# Patient Record
Sex: FEMALE | Race: WHITE
[De-identification: names, ages, dates, MRNs, and addresses within clinical notes are randomized per-mention and may not be internally consistent; named-entity substitution may affect disease eponyms.]

---

## 2020-10-11 ENCOUNTER — HOSPITAL ENCOUNTER (EMERGENCY)
Dept: HOSPITAL 9 - MADERS | Age: 45
Discharge: HOME | End: 2020-10-11
Payer: SELF-PAY

## 2020-10-11 DIAGNOSIS — I10: ICD-10-CM

## 2020-10-11 DIAGNOSIS — N39.0: Primary | ICD-10-CM

## 2020-10-11 DIAGNOSIS — F17.210: ICD-10-CM

## 2020-10-11 LAB
BACTERIA UR QL AUTO: (no result) HPF
RBC UR QL AUTO: (no result) HPF (ref 0–3)
SP GR UR STRIP: 1.02 (ref 1–1.03)
WBC UR QL AUTO: (no result) HPF (ref 0–3)

## 2020-10-11 PROCEDURE — 87077 CULTURE AEROBIC IDENTIFY: CPT

## 2020-10-11 PROCEDURE — 96372 THER/PROPH/DIAG INJ SC/IM: CPT

## 2020-10-11 PROCEDURE — 99283 EMERGENCY DEPT VISIT LOW MDM: CPT

## 2020-10-11 PROCEDURE — 87186 SC STD MICRODIL/AGAR DIL: CPT

## 2020-10-11 PROCEDURE — 81015 MICROSCOPIC EXAM OF URINE: CPT

## 2020-10-11 PROCEDURE — 87086 URINE CULTURE/COLONY COUNT: CPT

## 2020-10-11 PROCEDURE — 81003 URINALYSIS AUTO W/O SCOPE: CPT

## 2021-01-31 ENCOUNTER — HOSPITAL ENCOUNTER (EMERGENCY)
Dept: HOSPITAL 9 - MADERS | Age: 46
Discharge: HOME | End: 2021-01-31
Payer: SELF-PAY

## 2021-01-31 DIAGNOSIS — R07.9: ICD-10-CM
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
## 2021-01-31 DIAGNOSIS — I10: ICD-10-CM

## 2021-01-31 DIAGNOSIS — S01.81XA: Primary | ICD-10-CM

## 2021-01-31 DIAGNOSIS — S40.012A: ICD-10-CM

## 2021-01-31 DIAGNOSIS — M54.2: ICD-10-CM

## 2021-01-31 DIAGNOSIS — F17.210: ICD-10-CM

## 2021-01-31 PROCEDURE — 70450 CT HEAD/BRAIN W/O DYE: CPT

## 2021-01-31 PROCEDURE — G0390 TRAUMA RESPONS W/HOSP CRITI: HCPCS

## 2021-01-31 PROCEDURE — 12051 INTMD RPR FACE/MM 2.5 CM/<: CPT

## 2021-01-31 PROCEDURE — 96374 THER/PROPH/DIAG INJ IV PUSH: CPT

## 2021-01-31 PROCEDURE — 12001 RPR S/N/AX/GEN/TRNK 2.5CM/<: CPT

## 2021-01-31 PROCEDURE — 72125 CT NECK SPINE W/O DYE: CPT

## 2021-01-31 PROCEDURE — 71045 X-RAY EXAM CHEST 1 VIEW: CPT
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
## 2021-02-04 ENCOUNTER — HOSPITAL ENCOUNTER (EMERGENCY)
Dept: HOSPITAL 9 - MADERS | Age: 46
Discharge: HOME | End: 2021-02-04
Payer: SELF-PAY

## 2021-02-04 DIAGNOSIS — S01.81XA: Primary | ICD-10-CM

## 2021-02-04 DIAGNOSIS — I10: ICD-10-CM

## 2021-02-04 DIAGNOSIS — J06.9: ICD-10-CM
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
## 2021-02-04 DIAGNOSIS — N94.89: ICD-10-CM

## 2021-02-04 DIAGNOSIS — F17.210: ICD-10-CM

## 2021-02-04 DIAGNOSIS — M25.512: ICD-10-CM

## 2021-02-04 DIAGNOSIS — S80.12XA: ICD-10-CM

## 2021-02-04 PROCEDURE — 74177 CT ABD & PELVIS W/CONTRAST: CPT
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
## 2024-08-23 ENCOUNTER — HOSPITAL ENCOUNTER (EMERGENCY)
Dept: HOSPITAL 9 - MADERS | Age: 49
Discharge: HOME | End: 2024-08-23
Payer: SELF-PAY

## 2024-08-23 DIAGNOSIS — T63.461A: Primary | ICD-10-CM

## 2024-08-23 DIAGNOSIS — F17.210: ICD-10-CM

## 2024-08-23 DIAGNOSIS — I10: ICD-10-CM

## 2024-08-23 PROCEDURE — 99283 EMERGENCY DEPT VISIT LOW MDM: CPT

## 2024-12-18 ENCOUNTER — HOSPITAL ENCOUNTER (EMERGENCY)
Dept: HOSPITAL 9 - MADERS | Age: 49
Discharge: HOME | End: 2024-12-18
Payer: SELF-PAY

## 2024-12-18 DIAGNOSIS — J01.90: Primary | ICD-10-CM

## 2024-12-18 DIAGNOSIS — F17.210: ICD-10-CM

## 2024-12-18 DIAGNOSIS — I10: ICD-10-CM

## 2024-12-18 PROCEDURE — 96372 THER/PROPH/DIAG INJ SC/IM: CPT

## 2024-12-18 PROCEDURE — 71046 X-RAY EXAM CHEST 2 VIEWS: CPT
